# Patient Record
Sex: FEMALE | Race: WHITE | NOT HISPANIC OR LATINO | Employment: FULL TIME | URBAN - METROPOLITAN AREA
[De-identification: names, ages, dates, MRNs, and addresses within clinical notes are randomized per-mention and may not be internally consistent; named-entity substitution may affect disease eponyms.]

---

## 2019-04-11 ENCOUNTER — TELEPHONE (OUTPATIENT)
Dept: ENDOCRINOLOGY | Facility: CLINIC | Age: 34
End: 2019-04-11

## 2019-05-13 LAB
CREAT ?TM UR-SCNC: 55.7 UMOL/L
EXT PROTEIN URINE: 24.7
PROT/CREAT UR: 443 MG/G{CREAT}

## 2019-06-06 ENCOUNTER — CONSULT (OUTPATIENT)
Dept: ENDOCRINOLOGY | Facility: HOSPITAL | Age: 34
End: 2019-06-06
Payer: COMMERCIAL

## 2019-06-06 VITALS
HEIGHT: 63 IN | HEART RATE: 90 BPM | WEIGHT: 140 LBS | SYSTOLIC BLOOD PRESSURE: 120 MMHG | BODY MASS INDEX: 24.8 KG/M2 | DIASTOLIC BLOOD PRESSURE: 70 MMHG

## 2019-06-06 DIAGNOSIS — E05.90 HYPERTHYROIDISM: Primary | ICD-10-CM

## 2019-06-06 PROCEDURE — 99244 OFF/OP CNSLTJ NEW/EST MOD 40: CPT | Performed by: INTERNAL MEDICINE

## 2019-06-06 RX ORDER — PROPRANOLOL HYDROCHLORIDE 10 MG/1
10 TABLET ORAL DAILY
Refills: 6 | COMMUNITY
Start: 2019-06-03

## 2019-06-07 ENCOUNTER — TELEPHONE (OUTPATIENT)
Dept: ENDOCRINOLOGY | Facility: HOSPITAL | Age: 34
End: 2019-06-07

## 2019-06-07 DIAGNOSIS — E05.90 HYPERTHYROIDISM: Primary | ICD-10-CM

## 2019-06-07 RX ORDER — METHIMAZOLE 10 MG/1
30 TABLET ORAL 3 TIMES DAILY
Qty: 270 TABLET | Refills: 2 | Status: SHIPPED | OUTPATIENT
Start: 2019-06-07 | End: 2019-08-06 | Stop reason: SDUPTHER

## 2019-06-13 ENCOUNTER — TELEPHONE (OUTPATIENT)
Dept: ENDOCRINOLOGY | Facility: HOSPITAL | Age: 34
End: 2019-06-13

## 2019-06-28 ENCOUNTER — TELEPHONE (OUTPATIENT)
Dept: ENDOCRINOLOGY | Facility: HOSPITAL | Age: 34
End: 2019-06-28

## 2019-07-01 ENCOUNTER — OFFICE VISIT (OUTPATIENT)
Dept: ENDOCRINOLOGY | Facility: HOSPITAL | Age: 34
End: 2019-07-01
Payer: COMMERCIAL

## 2019-07-01 VITALS
HEIGHT: 63 IN | HEART RATE: 90 BPM | WEIGHT: 136.4 LBS | BODY MASS INDEX: 24.17 KG/M2 | DIASTOLIC BLOOD PRESSURE: 70 MMHG | SYSTOLIC BLOOD PRESSURE: 120 MMHG

## 2019-07-01 DIAGNOSIS — E05.90 HYPERTHYROIDISM: Primary | ICD-10-CM

## 2019-07-01 PROCEDURE — 99214 OFFICE O/P EST MOD 30 MIN: CPT | Performed by: INTERNAL MEDICINE

## 2019-07-01 NOTE — PROGRESS NOTES
7/1/2019    Assessment/Plan      Diagnoses and all orders for this visit:    Hyperthyroidism  -     TSH, 3rd generation Lab Collect  -     T4, free Lab Collect  -     T3, free        Assessment/Plan:  Hyperthyroidism likely on the basis of grave's disease  She appears to be having a reaction to the methimazole  I have asked her to discontinue the methimazole  She is to call in several days to see if the symptoms of the reaction start to resolve  We will then have to determine the next treatment since she seems to have had a reaction to the methimazole  It is unfortunate since some of her hyperthyroid symptoms are improving  She will be getting blood work done tomorrow and I have asked her to check a TSH, free T, and Free T3       CC: hyperthyroid follow up    History of Present Illness     HPI: Beatriz Turner is a 35y o  year old female with history of Hyperthyroidism likely due to Graves disease  She was diagnosed with hyperthyroidism in the spring 2019 and started on methimazole 10 mg 3 tablets daily 06/07/2019  She developed itchiness and scratching, but no rash last week, 1 week ago  She saw PCP and treated as scabies with an ointment  She was having problems swallowing and throat irritated and feel s lump in throat over the last several days  She has been taking benadryl and a cream    She still has heat intolerance, tremors though improved, irritability, and emotional lability  She has lost another 4 lbs in 1 month  She has less frequent bowel movements  She still has insomnia  She denies diarrhea, constipation, palpitations  She has no diplopia  She has been out of work since 6/3/19 per her PCP  She has been exhausted and up all night , very emotional and couldn't work  She has been very overwhelmed at work and had to leave work  Review of Systems   Constitutional: Positive for fatigue and unexpected weight change  4 lbs less than last visit  HENT: Negative for trouble swallowing  Felt throat more scratchy and tight and a lump in throat  Eyes: Negative for visual disturbance  No diplopia  Respiratory: Negative for chest tightness and shortness of breath  Cardiovascular: Negative for chest pain, palpitations and leg swelling  Gastrointestinal: Negative for abdominal pain, constipation, diarrhea and nausea  Bowel movements less frequent  Endocrine: Positive for heat intolerance  Negative for cold intolerance  Genitourinary:        Menses did occur since treatment started but light and only 3 days long as were very heavy in the past    Musculoskeletal: Positive for arthralgias  Having arthralgias  Migratory symptoms  Skin: Negative for rash  Has itchiness and worse with the heat  Neurological: Positive for tremors  Negative for dizziness and light-headedness  Tremors improved  Psychiatric/Behavioral: Positive for sleep disturbance  Negative for dysphoric mood  The patient is nervous/anxious  Anxiety improved  Still insomnia  Still very irritable and crying a lot  Historical Information   No past medical history on file    Past Surgical History:   Procedure Laterality Date    APPENDECTOMY       SECTION      X 2     Social History   Social History     Substance and Sexual Activity   Alcohol Use Never    Frequency: Never     Social History     Substance and Sexual Activity   Drug Use Not Currently    Types: Heroin    Comment: off herion for 3 years     Social History     Tobacco Use   Smoking Status Current Every Day Smoker    Types: Cigarettes   Smokeless Tobacco Never Used     Family History:   Family History   Problem Relation Age of Onset    Obesity Mother     Diabetes type II Mother     Psoriasis Mother         psoriatic arthritis    Hyperthyroidism Father     Diabetes type I Father         bilateral amputations    Obesity Father     Hyperlipidemia Father     Hyperthyroidism Paternal Aunt     Thyroid disease unspecified Paternal Aunt         3 aunts with thyroid disease    Thyroid cancer Paternal Aunt     Cancer Paternal Grandmother     Cancer Paternal Grandfather     No Known Problems Sister     No Known Problems Brother     No Known Problems Son     No Known Problems Son     Hyperthyroidism Maternal Aunt     Thyroid disease unspecified Maternal Aunt        Meds/Allergies   Current Outpatient Medications   Medication Sig Dispense Refill    methadone (DOLOPHINE) 10 MG/5ML solution Take 65 mg by mouth daily      methimazole (TAPAZOLE) 10 mg tablet Take 3 tablets (30 mg total) by mouth 3 (three) times a day 270 tablet 2    propranolol (INDERAL) 10 mg tablet Take 10 mg by mouth 2 (two) times a day Has not started this yet  6     No current facility-administered medications for this visit  Allergies   Allergen Reactions    Lasix [Furosemide] Hives       Objective   Vitals: Blood pressure 120/70, pulse 90, height 5' 3" (1 6 m), weight 61 9 kg (136 lb 6 4 oz)  Invasive Devices     None                 Physical Exam   Constitutional: She is oriented to person, place, and time  She appears well-developed and well-nourished  HENT:   Head: Normocephalic and atraumatic  Eyes: Pupils are equal, round, and reactive to light  Conjunctivae and EOM are normal    No lid lag, stare, proptosis, or periorbital edema  Neck: Normal range of motion  Neck supple  No thyromegaly present  Thyroid enlarged with a 6 cm right lobe and a 5 cm left lobe  Bruit noted over the thyroid gland  No palpable thyroid nodules  Cardiovascular: Normal rate, regular rhythm and normal heart sounds  No murmur heard  Pulmonary/Chest: Effort normal and breath sounds normal  She has no wheezes  Musculoskeletal: Normal range of motion  She exhibits no edema or deformity  Slight tremor of the outstretched hands  Lymphadenopathy:     She has no cervical adenopathy     Neurological: She is alert and oriented to person, place, and time  She has normal reflexes  Deep tendon reflexes normal without briskness  Skin: Skin is warm and dry  No rash noted  Vitals reviewed  The history was obtained from the review of the chart and from the patient  Lab Results:   I have no recent blood work      Future Appointments   Date Time Provider [place]   8/22/2019  8:20 AM Juancho Nguyen MD [facility]

## 2019-07-01 NOTE — PATIENT INSTRUCTIONS
Let's stop the methimazole for now  Continue the same propranolol  You can use benadryl for now   let's repeat the thyroid levels  Call with itchiness status by Friday    Follow up to be determined

## 2019-07-03 LAB
T3FREE SERPL-MCNC: 8.9 PG/ML (ref 2–4.4)
T4 FREE SERPL-MCNC: 2.53 NG/DL (ref 0.82–1.77)
TSH SERPL DL<=0.005 MIU/L-ACNC: <0.005 UIU/ML (ref 0.45–4.5)

## 2019-07-08 ENCOUNTER — TELEPHONE (OUTPATIENT)
Dept: ENDOCRINOLOGY | Facility: CLINIC | Age: 34
End: 2019-07-08

## 2019-07-08 NOTE — TELEPHONE ENCOUNTER
Pt called today stating she stopped taking the thyroid med on 7-1-19  Since then she still had a rash and was itchy, so, she doesn't think she was allergic to the meds  If you have any questions, please call the pt  Thanx

## 2019-07-08 NOTE — TELEPHONE ENCOUNTER
She can have joint pain with hyperthyroidism  The lump is not from the thyroid, that should be evaluated by the PCP

## 2019-07-08 NOTE — TELEPHONE ENCOUNTER
Patient aware, she notes that since last week she developed a lump on left side of neck  She would also like to know if it is normal for her to have joint pain in different areas every day  She states that her joints feel stiff

## 2019-07-08 NOTE — TELEPHONE ENCOUNTER
I filled out her disability paperwork  This information will be faxed for her  Since her rash and itching is not improved, I would have her retry her methimazole 10 mg 2 tablets daily only  If she starts to have worsening of her rash in itching or any breathing problems, she must stop it immediately

## 2019-07-30 ENCOUNTER — TELEPHONE (OUTPATIENT)
Dept: ENDOCRINOLOGY | Facility: HOSPITAL | Age: 34
End: 2019-07-30

## 2019-08-06 DIAGNOSIS — E05.90 HYPERTHYROIDISM: ICD-10-CM

## 2019-08-06 RX ORDER — METHIMAZOLE 10 MG/1
30 TABLET ORAL 3 TIMES DAILY
Qty: 810 TABLET | Refills: 2 | Status: SHIPPED | OUTPATIENT
Start: 2019-08-06

## 2019-08-20 ENCOUNTER — TELEPHONE (OUTPATIENT)
Dept: ENDOCRINOLOGY | Facility: HOSPITAL | Age: 34
End: 2019-08-20

## 2019-08-20 NOTE — TELEPHONE ENCOUNTER
Received call from patient  She states she had 2 teeth that broke over the weekend  She is being evaluated by her dentist today, she believes she will have to have teeth removed this week  She would like to know if there are medications she needs to avoid because of her hyperthyroidism  She states she is taking the methimazole  Please advise

## 2019-08-20 NOTE — TELEPHONE ENCOUNTER
They need to avoid epinephrine injections which they sometimes add to numbing injections to treat teeth issues  This is not because of the methimazole, it is because to the hyperthyroidism and will make that worse

## 2019-08-22 ENCOUNTER — OFFICE VISIT (OUTPATIENT)
Dept: ENDOCRINOLOGY | Facility: HOSPITAL | Age: 34
End: 2019-08-22
Payer: COMMERCIAL

## 2019-08-22 VITALS
WEIGHT: 133.4 LBS | SYSTOLIC BLOOD PRESSURE: 120 MMHG | BODY MASS INDEX: 23.64 KG/M2 | HEIGHT: 63 IN | DIASTOLIC BLOOD PRESSURE: 70 MMHG | HEART RATE: 82 BPM

## 2019-08-22 DIAGNOSIS — E05.00 GRAVES DISEASE: ICD-10-CM

## 2019-08-22 DIAGNOSIS — E05.90 HYPERTHYROIDISM: Primary | ICD-10-CM

## 2019-08-22 PROCEDURE — 99214 OFFICE O/P EST MOD 30 MIN: CPT | Performed by: INTERNAL MEDICINE

## 2019-08-22 NOTE — PATIENT INSTRUCTIONS
The thyrodi blood work was slightly improved in early July  Let's get blood work done now  Continue the same methimazole 10 mg 2 tablets twice a day  Follow up in 6-8 weeks with blood work

## 2019-08-22 NOTE — PROGRESS NOTES
8/23/2019    Assessment/Plan      Diagnoses and all orders for this visit:    Hyperthyroidism  -     TSH, 3rd generation Lab Collect  -     T4, free Lab Collect  -     T3, free  -     Comprehensive metabolic panel Lab Collect  -     TSH, 3rd generation Lab Collect; Future  -     T4, free Lab Collect; Future  -     T3, free; Future    Graves disease  -     TSH, 3rd generation Lab Collect  -     T4, free Lab Collect  -     T3, free  -     Comprehensive metabolic panel Lab Collect  -     TSH, 3rd generation Lab Collect; Future  -     T4, free Lab Collect; Future  -     T3, free; Future        Assessment/Plan:   hyperthyroidism due to Graves disease  Her last blood work in early July had started to improve on methimazole treatment  I have no more recent blood work  She will get a TSH with free T4 and free T3 and CMP done now  For now, she will continue the same methimazole 20 mg twice a day  She is still having a lot of hyperthyroid symptoms and is unable to work  I have also asked her to follow up in 6-8 weeks with preceding TSH, free T4, and free T3     CC:   Hyperthyroid follow-up    History of Present Illness     HPI: Ace Flowers is a 35y o  year old female with history of  Hyperthyroidism due to Graves disease  She was diagnosed with hyperthyroidism likely due to Graves disease in early June 2019  She was placed on methimazole 30 mg daily but did develop some rash temporarily  The rash did not resolve off methimazole so the methimazole was restarted in mid July at methimazole 10 mg 2 tablets daily  She will have days when feels good and days when significant symptoms again  She remains hot and sweaty  She still gets daily palpitations  She is still tremulous  She is still anxious  She has fatigue  Weight is 3 lb less than last visit  She will have episodes with heart racing, anxiety, and shakiness  She still has hair loss  Her arthralgias though are improving    Menstrual cycles are occurring Over 30 days and are usually late  She denies diarrhea or constipation, cold intolerance, or depression  She denies dry skin or brittle nails  She has no diplopia  She has been feeling more pressure with swallowing over the last 2-3 weeks  She thinks thyroid gland feels  larger, now on the right side  Now has a tooth issue and needs dental procedure  Review of Systems   Constitutional: Positive for fatigue  Negative for unexpected weight change  3 lbs less than 2019  HENT: Negative for trouble swallowing  Feels more pressure with swallowing for the last 2-3 weeks  Eyes: Negative for visual disturbance  No diplopia  Respiratory: Negative for chest tightness and shortness of breath  Cardiovascular: Positive for palpitations  Negative for chest pain  Still gets palpitations daily  Gastrointestinal: Negative for abdominal pain, constipation, diarrhea and nausea  Endocrine: Positive for heat intolerance  Negative for cold intolerance  Genitourinary:        Menses occurring over 30 days and late but occurring  Musculoskeletal: Positive for arthralgias  Arthralgias improved  Skin: Negative for rash  No dry skin  No brittle nails  Still hair loss  Neurological: Positive for tremors  Negative for dizziness, light-headedness and headaches  Psychiatric/Behavioral: Positive for sleep disturbance  Negative for dysphoric mood  The patient is nervous/anxious  Will have episodes of heart racing , anxiety and shakiness, not constant  Historical Information   No past medical history on file    Past Surgical History:   Procedure Laterality Date    APPENDECTOMY       SECTION      X 2     Social History   Social History     Substance and Sexual Activity   Alcohol Use Never    Frequency: Never     Social History     Substance and Sexual Activity   Drug Use Not Currently    Types: Heroin    Comment: off herion for 3 years     Social History     Tobacco Use   Smoking Status Current Every Day Smoker    Types: Cigarettes   Smokeless Tobacco Never Used     Family History:   Family History   Problem Relation Age of Onset    Obesity Mother     Diabetes type II Mother     Psoriasis Mother         psoriatic arthritis    Hyperthyroidism Father     Diabetes type I Father         bilateral amputations    Obesity Father     Hyperlipidemia Father     Hyperthyroidism Paternal Aunt     Thyroid disease unspecified Paternal Aunt         3 aunts with thyroid disease    Thyroid cancer Paternal Aunt     Cancer Paternal Grandmother     Cancer Paternal Grandfather     No Known Problems Sister     No Known Problems Brother     No Known Problems Son     No Known Problems Son     Hyperthyroidism Maternal Aunt     Thyroid disease unspecified Maternal Aunt        Meds/Allergies   Current Outpatient Medications   Medication Sig Dispense Refill    methadone (DOLOPHINE) 10 MG/5ML solution Take 65 mg by mouth daily      methimazole (TAPAZOLE) 10 mg tablet Take 3 tablets (30 mg total) by mouth 3 (three) times a day (Patient taking differently: Take 20 mg by mouth 2 (two) times a day ) 810 tablet 2    propranolol (INDERAL) 10 mg tablet Take 10 mg by mouth daily   6     No current facility-administered medications for this visit  Allergies   Allergen Reactions    Lasix [Furosemide] Hives       Objective   Vitals: Blood pressure 120/70, pulse 82, height 5' 3" (1 6 m), weight 60 5 kg (133 lb 6 4 oz)  Invasive Devices     None                 Physical Exam   Constitutional: She is oriented to person, place, and time  She appears well-developed and well-nourished  HENT:   Head: Normocephalic and atraumatic  Eyes: Pupils are equal, round, and reactive to light  Conjunctivae and EOM are normal      No lid lag, stare, proptosis, or periorbital edema  Neck: Normal range of motion  Neck supple  Thyromegaly present     7 cm right and 6 cm left thyroid gland  Bilateral bruits of the thyroid  Cardiovascular: Normal rate, regular rhythm and normal heart sounds  No murmur heard  Pulmonary/Chest: Effort normal and breath sounds normal  She has no wheezes  Musculoskeletal: Normal range of motion  She exhibits no edema or deformity  Slight fine tremor of the outstretched hands  Lymphadenopathy:     She has no cervical adenopathy  Neurological: She is alert and oriented to person, place, and time  She has normal reflexes  Deep tendon reflexes a bit brisk  Skin: Skin is warm and dry  No rash noted  Vitals reviewed  The history was obtained from the review of the chart and from the patient  Lab Results:        Blood work done on 07/02/2019 demonstrates the following results:  Lab Results   Component Value Date    TSH <0 005 (L) 07/02/2019    FREET4 2 53 (H) 07/02/2019     Free T3 was 8 9        Future Appointments   Date Time Provider [place]   10/18/2019  9:00 AM Latia Cisneros MD [facility]

## 2019-08-27 ENCOUNTER — TELEPHONE (OUTPATIENT)
Dept: ENDOCRINOLOGY | Facility: HOSPITAL | Age: 34
End: 2019-08-27

## 2019-08-27 NOTE — TELEPHONE ENCOUNTER
Patient also stopped in and dropped off additional disability forms for [employer]  Are in your in-bin  Call patient when ready to  ([phone])

## 2019-08-27 NOTE — TELEPHONE ENCOUNTER
Received request from [facility] from [facility] for last office note and form for short term disability to be filled out  In your in-bin   Release of information scanned in

## 2019-08-28 LAB
ALBUMIN SERPL-MCNC: 4.4 G/DL (ref 3.5–5.5)
ALBUMIN/GLOB SERPL: 1.8 {RATIO} (ref 1.2–2.2)
ALP SERPL-CCNC: 185 IU/L (ref 39–117)
ALT SERPL-CCNC: 31 IU/L (ref 0–32)
AST SERPL-CCNC: 29 IU/L (ref 0–40)
BILIRUB SERPL-MCNC: 0.3 MG/DL (ref 0–1.2)
BUN SERPL-MCNC: 8 MG/DL (ref 6–20)
BUN/CREAT SERPL: 16 (ref 9–23)
CALCIUM SERPL-MCNC: 9.7 MG/DL (ref 8.7–10.2)
CHLORIDE SERPL-SCNC: 103 MMOL/L (ref 96–106)
CO2 SERPL-SCNC: 22 MMOL/L (ref 20–29)
CREAT SERPL-MCNC: 0.49 MG/DL (ref 0.57–1)
GLOBULIN SER-MCNC: 2.4 G/DL (ref 1.5–4.5)
GLUCOSE SERPL-MCNC: 113 MG/DL (ref 65–99)
POTASSIUM SERPL-SCNC: 4.3 MMOL/L (ref 3.5–5.2)
PROT SERPL-MCNC: 6.8 G/DL (ref 6–8.5)
SL AMB EGFR AFRICAN AMERICAN: 148 ML/MIN/1.73
SL AMB EGFR NON AFRICAN AMERICAN: 128 ML/MIN/1.73
SODIUM SERPL-SCNC: 139 MMOL/L (ref 134–144)
T3FREE SERPL-MCNC: 15.2 PG/ML (ref 2–4.4)
T4 FREE SERPL-MCNC: 4.24 NG/DL (ref 0.82–1.77)
TSH SERPL DL<=0.005 MIU/L-ACNC: <0.005 UIU/ML (ref 0.45–4.5)

## 2019-08-28 NOTE — TELEPHONE ENCOUNTER
Faxed Disability paperwork to [employer] (fax [phone])  Called patient and l/m that [employer] Dis[employer] form is ready for her to

## 2019-10-18 ENCOUNTER — OFFICE VISIT (OUTPATIENT)
Dept: ENDOCRINOLOGY | Facility: HOSPITAL | Age: 34
End: 2019-10-18
Payer: COMMERCIAL

## 2019-10-18 VITALS
HEIGHT: 63 IN | WEIGHT: 146 LBS | BODY MASS INDEX: 25.87 KG/M2 | SYSTOLIC BLOOD PRESSURE: 124 MMHG | DIASTOLIC BLOOD PRESSURE: 70 MMHG | HEART RATE: 72 BPM

## 2019-10-18 DIAGNOSIS — E05.00 GRAVES DISEASE: ICD-10-CM

## 2019-10-18 DIAGNOSIS — E05.90 HYPERTHYROIDISM: Primary | ICD-10-CM

## 2019-10-18 LAB
T3FREE SERPL-MCNC: 5.2 PG/ML (ref 2–4.4)
T4 FREE SERPL-MCNC: 2.26 NG/DL (ref 0.82–1.77)
TSH SERPL DL<=0.005 MIU/L-ACNC: <0.005 UIU/ML (ref 0.45–4.5)

## 2019-10-18 PROCEDURE — 99214 OFFICE O/P EST MOD 30 MIN: CPT | Performed by: INTERNAL MEDICINE

## 2019-10-18 NOTE — PATIENT INSTRUCTIONS
The thyrodi blood work is still overactive but much better  Continue the same methimazole 10 mg 2 tablets twice a day and we'll give it more time  Continue the propranolol  You can go back to work  You are cleared to go back on 10/21/19  We'll get blood work in 2 months  Follow up in 4 months with blood work

## 2019-10-18 NOTE — PROGRESS NOTES
10/20/2019    Assessment/Plan      Diagnoses and all orders for this visit:    Hyperthyroidism  -     TSH, 3rd generation Lab Collect; Future  -     T4, free Lab Collect; Future  -     T3, free; Future  -     T4, free Lab Collect; Future  -     TSH, 3rd generation Lab Collect; Future  -     T3, free; Future    Graves disease  -     TSH, 3rd generation Lab Collect; Future  -     T4, free Lab Collect; Future  -     T3, free; Future  -     T4, free Lab Collect; Future  -     TSH, 3rd generation Lab Collect; Future  -     T3, free; Future        Assessment/Plan:  1  Hyperthyroidism  Her hyperthyroidism is most likely on the basis of Graves disease based on history and physical exam   At this point, blood work does continue to show some hyperthyroidism but did levels of her blood work are significantly improved from September of 2019 she has significant improvement of symptomatology  At this point, I will continue the same methimazole 20 mg twice a day and propranolol 10 mg daily as needed  I would like to give a little bit more time on this dosage to see if we can get her blood work down even further  Hopefully I will not have to resort to I 131 treatment or surgery for her thyroid disease which was a possibility if her levels did not improve  At this point, she is cleared to go back to work  I did fill out this paperwork while she was in the office today  2  Graves disease  She has no evidence of Graves ophthalmopathy but I have asked her to make sure that she sees an eye doctor on a regular basis  I have asked her to repeat blood work in 2 months consisting of a TSH, free T4, and free T3 and methimazole can be adjusted as needed  I will have her follow up in 4 months with preceding TSH, free T4, and free T3       CC:   Hyperthyroid follow-up    History of Present Illness     HPI: Nara Blunt is a 35y o  year old female with history of Hyperthyroidism due to Graves disease    She was diagnosed in the spring 2019  She had difficulty when she was taking much higher dosages of methimazole with a mild itching and rash which has resolved and she has been able to tolerate methimazole at this point  She is on methimazole 10 mg 2 tablets twice a day  She is also taking propranolol 10 mg daily as needed  Weight is 13 lb more than last visit  She still has some fatigue but that is improved and she wants to get back to work as she feels she can function  She denies tremors, heat intolerance or cold intolerance, diarrhea or constipation, anxiety or depression, insomnia, palpitation, dry skin, or brittle nails  She still has some hair loss  She has no compressive thyroid symptoms or difficulties with swallowing  She has no diplopia  Menstrual cycles have been occurring on a regular monthly basis and lasting 3 days  Review of Systems   Constitutional: Positive for fatigue and unexpected weight change  Weight 13 lb more than last visit  Still some fatigue, much better, able to function  Not yet back to work  HENT: Negative for trouble swallowing  Eyes: Negative for visual disturbance  No diplopia  Respiratory: Negative for chest tightness and shortness of breath  Cardiovascular: Negative for chest pain and palpitations  Gastrointestinal: Negative for abdominal pain, constipation, diarrhea and nausea  Endocrine: Negative for cold intolerance and heat intolerance  Genitourinary:        Menses regular on a monthly basis and last 3 days  Skin: Negative for rash  No dry skin  No brittle nails  Less hair loss  Neurological: Negative for dizziness, tremors, weakness, light-headedness and headaches  Psychiatric/Behavioral: Negative for dysphoric mood and sleep disturbance  The patient is not nervous/anxious  Historical Information   No past medical history on file    Past Surgical History:   Procedure Laterality Date    APPENDECTOMY       SECTION      X 2 Social History   Social History     Substance and Sexual Activity   Alcohol Use Never    Frequency: Never     Social History     Substance and Sexual Activity   Drug Use Not Currently    Types: Heroin    Comment: off herion for 3 years     Social History     Tobacco Use   Smoking Status Current Every Day Smoker    Types: Cigarettes   Smokeless Tobacco Never Used     Family History:   Family History   Problem Relation Age of Onset    Obesity Mother     Diabetes type II Mother     Psoriasis Mother         psoriatic arthritis    Hyperthyroidism Father     Diabetes type I Father         bilateral amputations    Obesity Father     Hyperlipidemia Father     Hyperthyroidism Paternal Aunt     Thyroid disease unspecified Paternal Aunt         3 aunts with thyroid disease    Thyroid cancer Paternal Aunt     Cancer Paternal Grandmother     Cancer Paternal Grandfather     No Known Problems Sister     No Known Problems Brother     No Known Problems Son     No Known Problems Son     Hyperthyroidism Maternal Aunt     Thyroid disease unspecified Maternal Aunt        Meds/Allergies   Current Outpatient Medications   Medication Sig Dispense Refill    methadone (DOLOPHINE) 10 MG/5ML solution Take 65 mg by mouth daily      methimazole (TAPAZOLE) 10 mg tablet Take 3 tablets (30 mg total) by mouth 3 (three) times a day (Patient taking differently: Take 20 mg by mouth 2 (two) times a day ) 810 tablet 2    propranolol (INDERAL) 10 mg tablet Take 10 mg by mouth daily   6     No current facility-administered medications for this visit  Allergies   Allergen Reactions    Lasix [Furosemide] Hives       Objective   Vitals: Blood pressure 124/70, pulse 72, height 5' 3" (1 6 m), weight 66 2 kg (146 lb)  Invasive Devices     None                 Physical Exam   Constitutional: She is oriented to person, place, and time  She appears well-developed and well-nourished  HENT:   Head: Normocephalic and atraumatic  Eyes: Pupils are equal, round, and reactive to light  Conjunctivae and EOM are normal    No lid lag, stare, proptosis, or periorbital edema  Neck: Normal range of motion  Neck supple  No thyromegaly present  Thyroid remains enlarged with lobes measuring 6 cm on the right and 5 cm on the left in height  No bruits over the thyroid gland or carotids  Cardiovascular: Normal rate, regular rhythm and normal heart sounds  No murmur heard  Pulmonary/Chest: Effort normal and breath sounds normal  She has no wheezes  Musculoskeletal: Normal range of motion  She exhibits no edema or deformity  Slight fine tremor of the outstretched hands  Lymphadenopathy:     She has no cervical adenopathy  Neurological: She is alert and oriented to person, place, and time  She has normal reflexes  Deep tendon reflexes a bit brisk  Skin: Skin is warm and dry  No rash noted  Vitals reviewed  The history was obtained from the review of the chart and from the patient  Lab Results:      Blood work done at [facility] on 10/17/2019 showed a TSH of less than 0 005 with a free T4 at 2 26 and free T3 of 5 2  Previous blood work done back on 08/27/2019 showed a TSH of less than 0 005 with a free T4 of 4 24 and a free T3 of 15 2        Future Appointments   Date Time Provider [place]   2/21/2020  9:45 AM KEVAN Gaitan [facility]

## 2020-01-05 ENCOUNTER — HOSPITAL ENCOUNTER (EMERGENCY)
Facility: HOSPITAL | Age: 35
Discharge: HOME | End: 2020-01-05
Attending: EMERGENCY MEDICINE | Admitting: EMERGENCY MEDICINE
Payer: COMMERCIAL

## 2020-01-05 ENCOUNTER — APPOINTMENT (EMERGENCY)
Dept: ULTRASOUND IMAGING | Facility: HOSPITAL | Age: 35
End: 2020-01-05
Payer: COMMERCIAL

## 2020-01-05 VITALS
OXYGEN SATURATION: 100 % | RESPIRATION RATE: 18 BRPM | HEART RATE: 69 BPM | BODY MASS INDEX: 25.85 KG/M2 | TEMPERATURE: 97.3 F | WEIGHT: 145.94 LBS | SYSTOLIC BLOOD PRESSURE: 132 MMHG | DIASTOLIC BLOOD PRESSURE: 63 MMHG

## 2020-01-05 DIAGNOSIS — B34.9 VIRAL SYNDROME: ICD-10-CM

## 2020-01-05 DIAGNOSIS — O20.0 THREATENED ABORTION IN EARLY PREGNANCY: Primary | ICD-10-CM

## 2020-01-05 DIAGNOSIS — O23.40 UTI (URINARY TRACT INFECTION) DURING PREGNANCY: ICD-10-CM

## 2020-01-05 LAB
ABO GROUP BLD: NORMAL
ANION GAP SERPL CALCULATED.3IONS-SCNC: 5 MMOL/L (ref 4–13)
B-HCG SERPL-ACNC: ABNORMAL MIU/ML
BASOPHILS # BLD AUTO: 0.03 THOUSANDS/ΜL (ref 0–0.1)
BASOPHILS NFR BLD AUTO: 0 % (ref 0–1)
BLD GP AB SCN SERPL QL: POSITIVE
BUN SERPL-MCNC: 9 MG/DL (ref 5–25)
CALCIUM SERPL-MCNC: 9.5 MG/DL (ref 8.3–10.1)
CHLORIDE SERPL-SCNC: 101 MMOL/L (ref 100–108)
CLARITY, POC: CLEAR
CO2 SERPL-SCNC: 28 MMOL/L (ref 21–32)
COLOR, POC: NORMAL
CREAT SERPL-MCNC: 0.45 MG/DL (ref 0.6–1.3)
EOSINOPHIL # BLD AUTO: 0.07 THOUSAND/ΜL (ref 0–0.61)
EOSINOPHIL NFR BLD AUTO: 1 % (ref 0–6)
ERYTHROCYTE [DISTWIDTH] IN BLOOD BY AUTOMATED COUNT: 11.9 % (ref 11.6–15.1)
EXT BILIRUBIN, UA: NEGATIVE
EXT BLOOD URINE: NORMAL
EXT GLUCOSE, UA: NEGATIVE
EXT KETONES: NEGATIVE
EXT NITRITE, UA: POSITIVE
EXT PH, UA: 7
EXT PREG TEST URINE: POSITIVE
EXT PROTEIN, UA: NEGATIVE
EXT SPECIFIC GRAVITY, UA: 1.01
EXT UROBILINOGEN: 0.2
EXT. CONTROL ED NAV: NORMAL
GFR SERPL CREATININE-BSD FRML MDRD: 131 ML/MIN/1.73SQ M
GLUCOSE SERPL-MCNC: 66 MG/DL (ref 65–140)
GLUCOSE SERPL-MCNC: 85 MG/DL (ref 65–140)
GLUCOSE SERPL-MCNC: 97 MG/DL (ref 65–140)
HCT VFR BLD AUTO: 38.7 % (ref 34.8–46.1)
HGB BLD-MCNC: 13 G/DL (ref 11.5–15.4)
IMM GRANULOCYTES # BLD AUTO: 0.01 THOUSAND/UL (ref 0–0.2)
IMM GRANULOCYTES NFR BLD AUTO: 0 % (ref 0–2)
LYMPHOCYTES # BLD AUTO: 1.72 THOUSANDS/ΜL (ref 0.6–4.47)
LYMPHOCYTES NFR BLD AUTO: 25 % (ref 14–44)
MCH RBC QN AUTO: 27.8 PG (ref 26.8–34.3)
MCHC RBC AUTO-ENTMCNC: 33.6 G/DL (ref 31.4–37.4)
MCV RBC AUTO: 83 FL (ref 82–98)
MONOCYTES # BLD AUTO: 0.5 THOUSAND/ΜL (ref 0.17–1.22)
MONOCYTES NFR BLD AUTO: 7 % (ref 4–12)
NEUTROPHILS # BLD AUTO: 4.68 THOUSANDS/ΜL (ref 1.85–7.62)
NEUTS SEG NFR BLD AUTO: 67 % (ref 43–75)
NRBC BLD AUTO-RTO: 0 /100 WBCS
PLATELET # BLD AUTO: 209 THOUSANDS/UL (ref 149–390)
PMV BLD AUTO: 11.3 FL (ref 8.9–12.7)
POTASSIUM SERPL-SCNC: 3.9 MMOL/L (ref 3.5–5.3)
RBC # BLD AUTO: 4.68 MILLION/UL (ref 3.81–5.12)
RH BLD: NEGATIVE
SODIUM SERPL-SCNC: 134 MMOL/L (ref 136–145)
SPECIMEN EXPIRATION DATE: NORMAL
WBC # BLD AUTO: 7.01 THOUSAND/UL (ref 4.31–10.16)
WBC # BLD EST: NEGATIVE 10*3/UL

## 2020-01-05 PROCEDURE — 86901 BLOOD TYPING SEROLOGIC RH(D): CPT | Performed by: EMERGENCY MEDICINE

## 2020-01-05 PROCEDURE — 76801 OB US < 14 WKS SINGLE FETUS: CPT

## 2020-01-05 PROCEDURE — 86850 RBC ANTIBODY SCREEN: CPT | Performed by: EMERGENCY MEDICINE

## 2020-01-05 PROCEDURE — 80048 BASIC METABOLIC PNL TOTAL CA: CPT | Performed by: EMERGENCY MEDICINE

## 2020-01-05 PROCEDURE — 96375 TX/PRO/DX INJ NEW DRUG ADDON: CPT

## 2020-01-05 PROCEDURE — 96365 THER/PROPH/DIAG IV INF INIT: CPT

## 2020-01-05 PROCEDURE — 84702 CHORIONIC GONADOTROPIN TEST: CPT | Performed by: EMERGENCY MEDICINE

## 2020-01-05 PROCEDURE — 86900 BLOOD TYPING SEROLOGIC ABO: CPT | Performed by: EMERGENCY MEDICINE

## 2020-01-05 PROCEDURE — 36415 COLL VENOUS BLD VENIPUNCTURE: CPT | Performed by: EMERGENCY MEDICINE

## 2020-01-05 PROCEDURE — 82948 REAGENT STRIP/BLOOD GLUCOSE: CPT

## 2020-01-05 PROCEDURE — 81002 URINALYSIS NONAUTO W/O SCOPE: CPT | Performed by: EMERGENCY MEDICINE

## 2020-01-05 PROCEDURE — 96372 THER/PROPH/DIAG INJ SC/IM: CPT

## 2020-01-05 PROCEDURE — 96366 THER/PROPH/DIAG IV INF ADDON: CPT

## 2020-01-05 PROCEDURE — 99284 EMERGENCY DEPT VISIT MOD MDM: CPT | Performed by: EMERGENCY MEDICINE

## 2020-01-05 PROCEDURE — 86870 RBC ANTIBODY IDENTIFICATION: CPT | Performed by: EMERGENCY MEDICINE

## 2020-01-05 PROCEDURE — 81025 URINE PREGNANCY TEST: CPT | Performed by: EMERGENCY MEDICINE

## 2020-01-05 PROCEDURE — 99284 EMERGENCY DEPT VISIT MOD MDM: CPT

## 2020-01-05 PROCEDURE — 85025 COMPLETE CBC W/AUTO DIFF WBC: CPT | Performed by: EMERGENCY MEDICINE

## 2020-01-05 RX ORDER — DIPHENHYDRAMINE HYDROCHLORIDE 25 MG/1
25 CAPSULE ORAL 3 TIMES DAILY PRN
Qty: 30 TABLET | Refills: 0 | Status: SHIPPED | OUTPATIENT
Start: 2020-01-05 | End: 2020-02-28 | Stop reason: ALTCHOICE

## 2020-01-05 RX ORDER — CEPHALEXIN 250 MG/1
500 CAPSULE ORAL EVERY 12 HOURS SCHEDULED
Qty: 20 CAPSULE | Refills: 0 | Status: SHIPPED | OUTPATIENT
Start: 2020-01-05 | End: 2020-01-10

## 2020-01-05 RX ORDER — ONDANSETRON 2 MG/ML
4 INJECTION INTRAMUSCULAR; INTRAVENOUS ONCE
Status: COMPLETED | OUTPATIENT
Start: 2020-01-05 | End: 2020-01-05

## 2020-01-05 RX ORDER — DEXTROSE MONOHYDRATE 25 G/50ML
25 INJECTION, SOLUTION INTRAVENOUS ONCE
Status: DISCONTINUED | OUTPATIENT
Start: 2020-01-05 | End: 2020-01-05

## 2020-01-05 RX ORDER — CEPHALEXIN 250 MG/1
500 CAPSULE ORAL ONCE
Status: COMPLETED | OUTPATIENT
Start: 2020-01-05 | End: 2020-01-05

## 2020-01-05 RX ADMIN — ONDANSETRON 4 MG: 2 INJECTION INTRAMUSCULAR; INTRAVENOUS at 16:54

## 2020-01-05 RX ADMIN — DEXTROSE AND SODIUM CHLORIDE 1000 ML: 5; .9 INJECTION, SOLUTION INTRAVENOUS at 16:55

## 2020-01-05 RX ADMIN — HUMAN RHO(D) IMMUNE GLOBULIN 300 MCG: 300 INJECTION, SOLUTION INTRAMUSCULAR at 21:30

## 2020-01-05 RX ADMIN — CEPHALEXIN 500 MG: 250 CAPSULE ORAL at 19:17

## 2020-01-05 NOTE — ED PROVIDER NOTES
History  Chief Complaint   Patient presents with    Vaginal Bleeding     To ED with c/o nausea, cold sx, and vaginal bleeding off and on  for 3 weeks  Patient states that she took a pregnancy test 3 days ago which was positive   Nausea     29year-old  presents for evaluation of nausea, cold symptoms, and intermittent vaginal bleeding for the past couple of days  Patient states that she took a home pregnancy test 3 days ago although she has not been feeling well for 2 weeks  The patient states that she has mild intermittent spotting  Prior to Admission Medications   Prescriptions Last Dose Informant Patient Reported? Taking? methadone (DOLOPHINE) 10 MG/5ML solution  Self Yes No   Sig: Take 65 mg by mouth daily   methimazole (TAPAZOLE) 10 mg tablet  Self No No   Sig: Take 3 tablets (30 mg total) by mouth 3 (three) times a day   Patient taking differently: Take 20 mg by mouth 2 (two) times a day    propranolol (INDERAL) 10 mg tablet  Self Yes No   Sig: Take 10 mg by mouth daily       Facility-Administered Medications: None       No past medical history on file      Past Surgical History:   Procedure Laterality Date    APPENDECTOMY       SECTION      X 2       Family History   Problem Relation Age of Onset    Obesity Mother     Diabetes type II Mother     Psoriasis Mother         psoriatic arthritis    Hyperthyroidism Father     Diabetes type I Father         bilateral amputations    Obesity Father     Hyperlipidemia Father     Hyperthyroidism Paternal Aunt     Thyroid disease unspecified Paternal Aunt         3 aunts with thyroid disease    Thyroid cancer Paternal Aunt     Cancer Paternal Grandmother     Cancer Paternal Grandfather     No Known Problems Sister     No Known Problems Brother     No Known Problems Son     No Known Problems Son     Hyperthyroidism Maternal Aunt     Thyroid disease unspecified Maternal Aunt      I have reviewed and agree with the history as documented  Social History     Tobacco Use    Smoking status: Current Every Day Smoker     Types: Cigarettes    Smokeless tobacco: Never Used   Substance Use Topics    Alcohol use: Never     Frequency: Never    Drug use: Not Currently     Types: Heroin     Comment: off herion for 3 years        Review of Systems   Constitutional: Negative for chills and fever  Cardiovascular: Positive for chest pain  Gastrointestinal: Positive for nausea  Genitourinary: Positive for vaginal bleeding  Neurological: Negative for syncope and light-headedness  All other systems reviewed and are negative  Physical Exam  Physical Exam   Constitutional: She is oriented to person, place, and time  She appears well-developed and well-nourished  No distress  HENT:   Head: Normocephalic and atraumatic  Right Ear: External ear normal    Left Ear: External ear normal    Eyes: Pupils are equal, round, and reactive to light  Conjunctivae and EOM are normal  No scleral icterus  Neck: Normal range of motion  Cardiovascular: Normal rate, regular rhythm and normal heart sounds  Pulmonary/Chest: Effort normal and breath sounds normal  No respiratory distress  Abdominal: Soft  Bowel sounds are normal  There is no tenderness  There is no rebound and no guarding  Musculoskeletal: Normal range of motion  She exhibits no edema  Neurological: She is alert and oriented to person, place, and time  Skin: Skin is warm and dry  No rash noted  Psychiatric: She has a normal mood and affect  Nursing note and vitals reviewed  Vital Signs  ED Triage Vitals   Temp Pulse Resp BP SpO2   -- -- -- -- --      Temp src Heart Rate Source Patient Position - Orthostatic VS BP Location FiO2 (%)   -- -- -- -- --      Pain Score       --           There were no vitals filed for this visit        Visual Acuity      ED Medications  Medications - No data to display    Diagnostic Studies  Results Reviewed     None                 No orders to display              Procedures  Procedures         ED Course  ED Course as of  175   Sun 2020   1728 110[address] [facility],[address] tech notified of [address] order                                  MDM  Number of Diagnoses or Management Options  Threatened  in early pregnancy:   UTI (urinary tract infection) during pregnancy:   Viral syndrome:   Diagnosis management comments: Plan is to obtain laboratories and ultrasound to out ectopic pregnancy versus threatened miscarriage       Amount and/or Complexity of Data Reviewed  Clinical lab tests: ordered and reviewed  Tests in the radiology section of CPT®: ordered  Review and summarize past medical records: yes          Disposition  Final diagnoses:   None     ED Disposition     None      Follow-up Information    None         Patient's Medications   Discharge Prescriptions    No medications on file     No discharge procedures on file      ED Provider  Electronically Signed by           Gisela Cole DO  20 2155       Gisela Cole DO  20 2154

## 2020-01-06 NOTE — ED NOTES
Juice and crackers provided to patient, will reassess blood sugar in 15 minutes        Мария Lora RN  01/05/20 2040

## 2020-01-06 NOTE — ED NOTES
Pt signed consent for Rhogam, just waiting for the lab to send it up        Kandis Acevedo, PAULA  01/05/20 1921

## 2020-01-06 NOTE — ED NOTES
Still waiting for Rhogam from the lab, have been in communication and they are in the process of the order        Liliam Barrett, PAULA  01/05/20 8909

## 2020-01-06 NOTE — ED CARE HANDOFF
Emergency Department Sign Out Note        Sign out and transfer of care from Dr Mary Jane Novoa  See Separate Emergency Department note  The patient, Kristie Newton, was evaluated by the previous provider for vaginal bleeding  Workup Completed:  US OB < 14 weeks with transvaginal   Final Result       Single live intrauterine gestation measuring 8 weeks 3 days by crown-rump length  Follow-up as clinically warranted  Workstation performed: [id number]            Labs Reviewed   HCG, QUANTITATIVE - Abnormal       Result Value Ref Range Status    HCG, Quant 91,586 (*) <=6 mIU/mL Final    Narrative:      Expected Ranges:     Approximate               Approximate HCG  Gestation age          Concentration ( mIU/mL)  _____________          ______________________   [place]                      HCG values  0 2-1                       5-50  1-2                           2-3                         100-5000  3-4                         500-21969  4-5                         1000-27488  5-6                         [id number]  6-8                         [id number]  8-12                        [id number]     BASIC METABOLIC PANEL - Abnormal    Sodium 134 (*) 136 - 145 mmol/L Final    Potassium 3 9  3 5 - 5 3 mmol/L Final    Chloride 101  100 - 108 mmol/L Final    CO2 28  21 - 32 mmol/L Final    ANION GAP 5  4 - 13 mmol/L Final    BUN 9  5 - 25 mg/dL Final    Creatinine 0 45 (*) 0 60 - 1 30 mg/dL Final    Comment: Standardized to IDMS reference method    Glucose 85  65 - 140 mg/dL Final    Comment:   If the patient is fasting, the ADA then defines impaired fasting glucose as > 100 mg/dL and diabetes as > or equal to 123 mg/dL  Specimen collection should occur prior to Sulfasalazine administration due to the potential for falsely depressed results  Specimen collection should occur prior to Sulfapyridine administration due to the potential for falsely elevated results      Calcium 9 5  8 3 - 10 1 mg/dL Final    eGFR 131  ml/min/1 73sq m Final    Narrative:     National Kidney Disease Foundation guidelines for Chronic Kidney Disease (CKD):     Stage 1 with normal or high GFR (GFR > 90 mL/min/1 73 square meters)    Stage 2 Mild CKD (GFR = 60-89 mL/min/1 73 square meters)    Stage 3A Moderate CKD (GFR = 45-59 mL/min/1 73 square meters)    Stage 3B Moderate CKD (GFR = 30-44 mL/min/1 73 square meters)    Stage 4 Severe CKD (GFR = 15-29 mL/min/1 73 square meters)    Stage 5 End Stage CKD (GFR <15 mL/min/1 73 square meters)  Note: GFR calculation is accurate only with a steady state creatinine   POCT PREGNANCY, URINE - Normal    EXT PREG TEST UR (Ref: Negative) Positive   Final    Control Valid   Final   POCT URINALYSIS DIPSTICK - Normal    Color, UA Linda   Final    Clarity, UA Clear   Final    Glucose, UA (Ref: Negative) Negative   Final    Bilirubin, UA (Ref: Negative) Negative   Final    Ketones, UA (Ref: Negative) Negative   Final    Spec Grav, UA (Ref:1 003-1 030) 1 010   Final    Blood, UA (Ref: Negative) Hemolyzed Trace   Final    pH, UA (Ref: 4 5-8 0) 7 0   Final    Protein, UA (Ref: Negative) Negative   Final    Urobilinogen, UA (Ref: 0 2- 1 0) 0 2   Final     Leukocytes, UA (Ref: Negative) Negative   Final    Nitrite, UA (Ref: Negative) POSITIVE   Final   CBC AND DIFFERENTIAL    WBC 7 01  4 31 - 10 16 Thousand/uL Final    RBC 4 68  3 81 - 5 12 Million/uL Final    Hemoglobin 13 0  11 5 - 15 4 g/dL Final    Hematocrit 38 7  34 8 - 46 1 % Final    MCV 83  82 - 98 fL Final    MCH 27 8  26 8 - 34 3 pg Final    MCHC 33 6  31 4 - 37 4 g/dL Final    RDW 11 9  11 6 - 15 1 % Final    MPV 11 3  8 9 - 12 7 fL Final    Platelets 165  278 - 390 Thousands/uL Final    nRBC 0  /100 WBCs Final    Neutrophils Relative 67  43 - 75 % Final    Immat GRANS % 0  0 - 2 % Final    Lymphocytes Relative 25  14 - 44 % Final    Monocytes Relative 7  4 - 12 % Final    Eosinophils Relative 1  0 - 6 % Final    Basophils Relative 0  0 - 1 % Final    Neutrophils Absolute 4 68  1 85 - 7 62 Thousands/µL Final    Immature Grans Absolute 0 01  0 00 - 0 20 Thousand/uL Final    Lymphocytes Absolute 1 72  0 60 - 4 47 Thousands/µL Final    Monocytes Absolute 0 50  0 17 - 1 22 Thousand/µL Final    Eosinophils Absolute 0 07  0 00 - 0 61 Thousand/µL Final    Basophils Absolute 0 03  0 00 - 0 10 Thousands/µL Final   TYPE AND SCREEN    ABO Grouping O   Final    Rh Factor Negative   Final    Antibody Screen Positive   Final    Specimen Expiration Date [id number]   Final   ANTIBODY IDENTIFICATION    ANTIBODY ID  #1 Passive D Antibody, Patient Received RHIG   Final        ED Course / Workup Pending (followup):                            ED Course as of 2110   Maliha Felicitas  Pt signed out from Dr Sahyy Bello  U/S pend, if unremarkable, can be d/c home after rhogam and rx for abx for uti   U/S noted  Pt was feeling slightly shaky, glucose 66  Will let her eat and recheck  After that, and rhogam, she can be d/c home to f/u with OB  Discussed supportive care for likely viral syndrome, and close f/u  Has had N/V, recommend vit B6  Pt also UTI, keflex prescribed   Issues with rhogam are holding up discharge  Nursing staff has contacted pharmacy multiple times          Procedures  MDM  Number of Diagnoses or Management Options  Threatened  in early pregnancy: new and requires workup  UTI (urinary tract infection) during pregnancy: new and requires workup  Viral syndrome: new and requires workup     Amount and/or Complexity of Data Reviewed  Clinical lab tests: reviewed  Tests in the radiology section of CPT®: reviewed  Tests in the medicine section of CPT®: reviewed  Discuss the patient with other providers: yes  Independent visualization of images, tracings, or specimens: yes    Risk of Complications, Morbidity, and/or Mortality  Presenting problems: moderate  Diagnostic procedures: low  Management options: low    Patient Progress  Patient progress: stable      Disposition  Final diagnoses:   Threatened  in early pregnancy   UTI (urinary tract infection) during pregnancy   Viral syndrome     Time reflects when diagnosis was documented in both MDM as applicable and the Disposition within this note     Time User Action Codes Description Comment    2020  6:49 PM Chau Aragon Add [O20 0] Threatened  in early pregnancy     2020  6:55 PM Chau Aragon Add [O23 40] UTI (urinary tract infection) during pregnancy     2020  8:30 PM Jeanna Rosen Add [B34 9] Viral syndrome       ED Disposition     ED Disposition Condition Date/Time Comment    Discharge Stable Sun 2020  8:33 PM Laura James discharge to home/self care              Follow-up Information     Follow up With Specialties Details Why Contact Info Additional [address] [facility] Obstetrics and Gynecology Schedule an appointment as soon as possible for a visit  For further evaluation [address] [facility] [address] [phone] BYGHZP ODEFE YHQIFILLQT RPHBLXMGPM, [address] [place], [place], [address] [address] Emergency Department Emergency Medicine  If symptoms worsen [address] [address] [phone]  ED, [address] [place] [address] [address]        Patient's Medications   Discharge Prescriptions    CEPHALEXIN (KEFLEX) 250 MG CAPSULE    Take 2 capsules (500 mg total) by mouth every 12 (twelve) hours for 5 days       Start Date: 2020  End Date: 1/10/2020       Order Dose: 500 mg       Quantity: 20 capsule    Refills: 0    PYRIDOXINE HCL (VITAMIN B-6) 25 MG TABLET    Take 1 tablet (25 mg total) by mouth 3 (three) times a day as needed (nausea and vomiting during pregnancy)       Start Date: 2020  End Date: --       Order Dose: 25 mg       Quantity: 30 tablet    Refills: 0 No discharge procedures on file         ED Provider  Electronically Signed by     Clemencia Cespedes MD  01/05/20 5191

## 2020-01-07 ENCOUNTER — TELEPHONE (OUTPATIENT)
Dept: ENDOCRINOLOGY | Facility: HOSPITAL | Age: 35
End: 2020-01-07

## 2020-01-17 ENCOUNTER — TELEPHONE (OUTPATIENT)
Dept: EMERGENCY DEPT | Facility: HOSPITAL | Age: 35
End: 2020-01-17

## 2020-01-17 LAB — BLOOD GROUP ANTIBODIES SERPL: NORMAL

## 2020-01-20 ENCOUNTER — TELEPHONE (OUTPATIENT)
Dept: EMERGENCY DEPT | Facility: HOSPITAL | Age: 35
End: 2020-01-20

## 2020-01-23 ENCOUNTER — TELEPHONE (OUTPATIENT)
Dept: EMERGENCY DEPT | Facility: HOSPITAL | Age: 35
End: 2020-01-23

## 2020-01-23 NOTE — TELEPHONE ENCOUNTER
Discussed the true D antibody with the patient and the associated increase risk of miscarriage  Patient had already electively terminated the pregnancy  Patient aware the risk for future pregnancies  Patient aware that she should make her OB/GYN aware  All questions answered prior to ending call

## 2020-02-27 LAB
T3FREE SERPL-MCNC: 4.5 PG/ML (ref 2–4.4)
T4 FREE SERPL-MCNC: 1.73 NG/DL (ref 0.82–1.77)
TSH SERPL DL<=0.005 MIU/L-ACNC: <0.005 UIU/ML (ref 0.45–4.5)

## 2020-02-28 ENCOUNTER — OFFICE VISIT (OUTPATIENT)
Dept: ENDOCRINOLOGY | Facility: HOSPITAL | Age: 35
End: 2020-02-28
Payer: COMMERCIAL

## 2020-02-28 VITALS
WEIGHT: 163 LBS | HEART RATE: 70 BPM | DIASTOLIC BLOOD PRESSURE: 80 MMHG | SYSTOLIC BLOOD PRESSURE: 122 MMHG | BODY MASS INDEX: 28.88 KG/M2 | HEIGHT: 63 IN

## 2020-02-28 DIAGNOSIS — E05.00 GRAVES DISEASE: ICD-10-CM

## 2020-02-28 DIAGNOSIS — E05.90 HYPERTHYROIDISM: Primary | ICD-10-CM

## 2020-02-28 PROCEDURE — 99214 OFFICE O/P EST MOD 30 MIN: CPT | Performed by: INTERNAL MEDICINE

## 2020-02-28 RX ORDER — NORGESTIMATE AND ETHINYL ESTRADIOL 7DAYSX3 LO
1 KIT ORAL DAILY
COMMUNITY

## 2020-02-28 NOTE — PROGRESS NOTES
2/28/2020    Assessment/Plan      Diagnoses and all orders for this visit:    Hyperthyroidism  -     Comprehensive metabolic panel Lab Collect; Future  -     CBC and differential Lab Collect; Future  -     TSH, 3rd generation Lab Collect; Future  -     T4, free Lab Collect; Future  -     T3, free; Future  -     Comprehensive metabolic panel Lab Collect  -     CBC and differential Lab Collect  -     TSH, 3rd generation Lab Collect  -     T4, free Lab Collect  -     T3, free    Graves disease  -     Comprehensive metabolic panel Lab Collect; Future  -     CBC and differential Lab Collect; Future  -     TSH, 3rd generation Lab Collect; Future  -     T4, free Lab Collect; Future  -     T3, free; Future  -     Comprehensive metabolic panel Lab Collect  -     CBC and differential Lab Collect  -     TSH, 3rd generation Lab Collect  -     T4, free Lab Collect  -     T3, free    Other orders  -     norgestimate-ethinyl estradiol (Tri-Lo-Cheryl) 0 18/0 215/0 25 MG-25 MCG per tablet; Take 1 tablet by mouth daily        Assessment/Plan:  1  Hyperthyroidism  Most recent thyroid function tests do show that her TSH is still fully suppressed and her T3 is elevated but T4 is normal   She has not been taking her methimazole appropriately  I have asked her to take her methimazole 10 mg 2 tablets twice a day consistently so that we get this hyperthyroidism under control  I offered her the possibility of radioiodine ablation which she does not want to do at this point  2  Graves disease  She has no evidence of Graves ophthalmopathy  I have asked her to follow up in 3 months with preceding TSH, free T4, free T3, CMP, and CBC  CC:  Hyperthyroid follow-up    History of Present Illness     HPI: Kathie Cui is a 29y o  year old female with history of hyperthyroidism due to Graves disease here for follow-up  She was diagnosed with Graves disease in the spring 2019   She had mild itching and rash when she was on higher doses of methimazole which resolved on its own  She has been able tolerate lid methimazole at this point  She is currently taking methimazole 10 mg 1 tablets twice a day and propranolol 10 mg daily  She forgets to take the other doses later in the day instead of 4 tablets a day  Admits she had not been taking it when caring with her father  Weight is 17 lb more than last visit  She has been having some problems with waking up throughout the night as she had been under lot more stress  She has dry skin but no itching or rash  She denies brittle nails or hair loss  She denies heat or cold intolerance, diarrhea or constipation, anxiety or depression, palpitation, tremors, or fatigue  Menstrual cycles are occurring on a regular monthly basis  She did just start oral contraceptives and has not yet gotten a menses since starting them  She denies diplopia  She was pregnant in 2020 and had   Under a lot of stress with helping care for dad with recent illnesses  Review of Systems   Constitutional: Positive for unexpected weight change  Negative for fatigue  Weight 17 lb more than last visit  HENT: Negative for trouble swallowing  Eyes: Negative for visual disturbance  No diplopia  Respiratory: Negative for chest tightness and shortness of breath  Cardiovascular: Negative for chest pain and palpitations  Gastrointestinal: Negative for abdominal pain, constipation, diarrhea and nausea  Endocrine: Negative for cold intolerance and heat intolerance  Genitourinary:        Menses were regular on a monthly basis  Has not yet gotten menses since   Started OCP about 3 weeks ago  Skin: Negative for rash  No rash or itching skin  Has dry skin  No brittle nails  No hair loss  Neurological: Negative for dizziness, tremors, light-headedness and headaches  Psychiatric/Behavioral: Negative for decreased concentration and sleep disturbance   The patient is not nervous/anxious  Under a lot of stress  Does wake some throughout the night  Not working at present and job closed in 2019  Historical Information   No past medical history on file    Past Surgical History:   Procedure Laterality Date    APPENDECTOMY       SECTION      X 2     Social History   Social History     Substance and Sexual Activity   Alcohol Use Never    Frequency: Never     Social History     Substance and Sexual Activity   Drug Use Not Currently    Types: Heroin    Comment: off herion for 3 years     Social History     Tobacco Use   Smoking Status Current Every Day Smoker    Packs/day: 0 50    Types: Cigarettes   Smokeless Tobacco Never Used     Family History:   Family History   Problem Relation Age of Onset    Obesity Mother     Diabetes type II Mother     Psoriasis Mother         psoriatic arthritis    Diabetes type I Father         bilateral amputations    Obesity Father     Hyperlipidemia Father     Kidney failure Father     Heart attack Father         post CABG    Hyperthyroidism Paternal Aunt     Thyroid disease unspecified Paternal Aunt         3 aunts with thyroid disease    Thyroid cancer Paternal Aunt     Cancer Paternal Grandmother     Cancer Paternal Grandfather     No Known Problems Sister     No Known Problems Brother     No Known Problems Son     No Known Problems Son     Hyperthyroidism Maternal Aunt     Thyroid disease unspecified Maternal Aunt        Meds/Allergies   Current Outpatient Medications   Medication Sig Dispense Refill    methadone (DOLOPHINE) 10 MG/5ML solution Take 65 mg by mouth daily      methimazole (TAPAZOLE) 10 mg tablet Take 3 tablets (30 mg total) by mouth 3 (three) times a day (Patient taking differently: Take 20 mg by mouth 2 (two) times a day ) 810 tablet 2    norgestimate-ethinyl estradiol (Tri-Lo-Cheryl) 0 18/0 215/0 25 MG-25 MCG per tablet Take 1 tablet by mouth daily      propranolol (INDERAL) 10 mg tablet Take 10 mg by mouth daily   6     No current facility-administered medications for this visit  Allergies   Allergen Reactions    Lasix [Furosemide] Hives       Objective   Vitals: Blood pressure 122/80, pulse 70, height 5' 3" (1 6 m), weight 73 9 kg (163 lb), not currently breastfeeding  Invasive Devices     None                 Physical Exam   Constitutional: She is oriented to person, place, and time  She appears well-developed and well-nourished  HENT:   Head: Normocephalic and atraumatic  Eyes: Pupils are equal, round, and reactive to light  Conjunctivae and EOM are normal    No lid lag, stare, proptosis, or periorbital edema  Neck: Normal range of motion  Neck supple  Thyromegaly present  Thyroid enlarged  No bruits over the thyroid gland or carotids  No palpable thyroid nodules  Cardiovascular: Normal rate, regular rhythm and normal heart sounds  No murmur heard  Pulmonary/Chest: Effort normal  She has no wheezes  Some expiratory wheezes throughout the lung fields  Musculoskeletal: Normal range of motion  She exhibits no edema or deformity  No tremor of the outstretched hands  Lymphadenopathy:     She has no cervical adenopathy  Neurological: She is alert and oriented to person, place, and time  She has normal reflexes  Deep tendon reflexes normal    Skin: Skin is warm and dry  No rash noted  Vitals reviewed  The history was obtained from the review of the chart and from the patient  Lab Results:     Blood work done on 02/26/2020 showed a TSH of less than 0 005, free T4 is 1 73, free T3 is 4 5      Lab Results   Component Value Date    CREATININE 0 45 (L) 01/05/2020    CREATININE 0 49 (L) 08/27/2019    BUN 9 01/05/2020    K 3 9 01/05/2020     01/05/2020    CO2 28 01/05/2020     eGFR   Date Value Ref Range Status   01/05/2020 131 ml/min/1 73sq m Final       Lab Results   Component Value Date    ALT 31 08/27/2019    AST 29 08/27/2019       Future Appointments Date Time Provider Blue Alysia   6/3/2020 10:00 AM Alondra Cruz MD [facility]

## 2020-02-28 NOTE — PATIENT INSTRUCTIONS
The thyroid is still a bit overactive but improved  You need to take the methimazole 10 mg 2 tablets twice a day consistently  No stopping of the medicine  You have the option of deciding to do I131 treatment if you choose  Follow up in 3 months with blood work

## 2023-03-30 ENCOUNTER — OFFICE VISIT (OUTPATIENT)
Dept: ENDOCRINOLOGY | Facility: HOSPITAL | Age: 38
End: 2023-03-30

## 2023-03-30 VITALS
DIASTOLIC BLOOD PRESSURE: 78 MMHG | HEIGHT: 62 IN | WEIGHT: 153 LBS | HEART RATE: 81 BPM | SYSTOLIC BLOOD PRESSURE: 120 MMHG | BODY MASS INDEX: 28.16 KG/M2

## 2023-03-30 DIAGNOSIS — E05.00 GRAVES DISEASE: ICD-10-CM

## 2023-03-30 DIAGNOSIS — E05.90 HYPERTHYROIDISM: Primary | ICD-10-CM

## 2023-03-30 RX ORDER — METHIMAZOLE 10 MG/1
20 TABLET ORAL DAILY
Qty: 60 TABLET | Refills: 6 | Status: SHIPPED | OUTPATIENT
Start: 2023-03-30

## 2023-03-30 RX ORDER — METOPROLOL SUCCINATE 50 MG/1
50 TABLET, EXTENDED RELEASE ORAL DAILY
Qty: 30 TABLET | Refills: 3 | Status: SHIPPED | OUTPATIENT
Start: 2023-03-30

## 2023-03-30 RX ORDER — METOPROLOL SUCCINATE 50 MG/1
50 TABLET, EXTENDED RELEASE ORAL DAILY
COMMUNITY
End: 2023-03-30 | Stop reason: SDUPTHER

## 2023-03-30 NOTE — PROGRESS NOTES
3/30/2023    Assessment/Plan      Diagnoses and all orders for this visit:    Hyperthyroidism  -     T4, free Lab Collect  -     T3, free  -     TSH, 3rd generation Lab Collect; Future  -     T4, free Lab Collect; Future  -     Comprehensive metabolic panel Lab Collect; Future  -     T3, free; Future  -     TSH, 3rd generation Lab Collect; Future  -     T4, free Lab Collect; Future  -     Comprehensive metabolic panel Lab Collect; Future  -     CBC and differential Lab Collect; Future  -     T3, free; Future  -     methimazole (TAPAZOLE) 10 mg tablet; Take 2 tablets (20 mg total) by mouth in the morning  -     metoprolol succinate (TOPROL-XL) 50 mg 24 hr tablet; Take 1 tablet (50 mg total) by mouth daily    Graves disease  -     T4, free Lab Collect  -     T3, free  -     TSH, 3rd generation Lab Collect; Future  -     T4, free Lab Collect; Future  -     Comprehensive metabolic panel Lab Collect; Future  -     T3, free; Future  -     TSH, 3rd generation Lab Collect; Future  -     T4, free Lab Collect; Future  -     Comprehensive metabolic panel Lab Collect; Future  -     CBC and differential Lab Collect; Future  -     T3, free; Future    Other orders  -     Discontinue: metoprolol succinate (TOPROL-XL) 50 mg 24 hr tablet; Take 50 mg by mouth daily        Assessment/Plan:  1  Hyperthyroidism  She does have hyperthyroid symptoms and TSH is fully suppressed although T4 level is normal   I will check a free T3 and free T4 level now to see if the free T3 is elevated  We did discuss the 3 options of treatment including antithyroid medication, radioactive iodine, and thyroid surgery  She is elected to restart antithyroid medication  I will then start methimazole 20 mg daily  I will repeat a TSH with free T4 and free T3 along with CMP in 6 weeks and adjust accordingly  2   Graves' disease  She does have a history of hyperthyroidism due to Graves' disease    She has no evidence of Graves' ophthalmopathy but her TSH is now fully suppressed  She will get blood work done now consisting of a free T4 and free T3  I have asked her to repeat blood work in 6 weeks consisting of a TSH, free T4, free T3, and CMP  I have asked her to follow-up in 3 months with preceding TSH, free T4, free T3, CMP, and CBC  CC: Thyroid consult    History of Present Illness     HPI: Andre Floyd is a 40y o  year old female with history of hyperthyroidism for evaluation/consult  She was diagnosed with hyperthyroidism in June 2019 after a several month episode of leg edema  She was started on methimazole treatment  Methimazole was tapered as blood work improved and she was last seen by endocrinology in February 2020  Sometime after that, she stopped her methimazole treatment on her own but is unclear when  She was feeling well up until January 2023 when she noticed she had constant diarrhea  She also was under a lot of stress but blood work was done by her primary care physician showing abnormality and thyroid function studies and she was asked to follow-up with endocrinology  She did not yet get an appointment and started to have worsening symptoms  She was getting heart racing and pounding feeling shaky with mind racing and insomnia  She went to see urgent care last week and was started on metoprolol  Starting metoprolol, her heart racing and palpitations along with tremors and shakiness have improved  She is fatigued  She is hot and sweaty  She denies cold intolerance  She has lost weight in the last 2 months, about 15 pounds  She continues to have anxiety and insomnia and diarrhea  She denies depression or constipation  She denies dry skin, brittle nails, or hair loss  She denies diplopia  She denies compressive thyroid symptoms or difficulties with swallowing  Menstrual cycles have been absent as she has been on Depo-Provera for the last 2 years  Review of Systems   Constitutional: Positive for fatigue  Negative for unexpected weight change  Weight loss 168 lbs to 153 lbs in about 2 months  HENT: Negative for hearing loss, tinnitus and trouble swallowing  Feels something in your throat with swallowing  Mostly on the left  Eyes: Negative for visual disturbance  No diplopia  Respiratory: Negative for chest tightness and shortness of breath  Cardiovascular: Positive for palpitations and leg swelling  Negative for chest pain  Palpitations improving  Occasional edema  Gastrointestinal: Positive for diarrhea  Negative for abdominal pain, constipation and nausea  Endocrine: Positive for heat intolerance  Negative for cold intolerance  Genitourinary:        No menses due to being on Depo-Provera for the last 2 years  Musculoskeletal: Negative for arthralgias and back pain  Skin: Negative for rash  No dry skin  No brittle nails  No hair loss  Neurological: Positive for dizziness, tremors and light-headedness  Negative for numbness and headaches  Was dizzy and lightheaded before the metoprolol and was shaky  Psychiatric/Behavioral: Positive for sleep disturbance  Negative for dysphoric mood  The patient is nervous/anxious  Sleeping varies  Had insomnia, will have difficulty falling asleep , mind racing  Metoprolol helping  Has anxiety  Historical Information   History reviewed  No pertinent past medical history    Past Surgical History:   Procedure Laterality Date   • APPENDECTOMY     •  SECTION      X 2     Social History   Social History     Substance and Sexual Activity   Alcohol Use Yes    Comment: occasional     Social History     Substance and Sexual Activity   Drug Use Not Currently   • Types: Heroin    Comment: off heroin for 8yearsas of      Social History     Tobacco Use   Smoking Status Every Day   • Packs/day: 0 50   • Types: Cigarettes   Smokeless Tobacco Never     Family History:   Family History   Problem Relation "Age of Onset   • Obesity Mother    • Diabetes type II Mother    • Psoriasis Mother         psoriatic arthritis   • Diabetes type I Father         bilateral amputations   • Obesity Father    • Hyperlipidemia Father    • Kidney failure Father    • Heart attack Father         post CABG   • Obesity Sister    • No Known Problems Brother    • Hyperthyroidism Maternal Aunt    • Thyroid disease unspecified Maternal Aunt    • Hyperthyroidism Paternal Aunt    • Thyroid disease unspecified Paternal Aunt         3 aunts with thyroid disease   • Thyroid cancer Paternal Aunt    • Cancer Paternal Grandmother    • Cancer Paternal Grandfather    • No Known Problems Son    • No Known Problems Son        Meds/Allergies   Current Outpatient Medications   Medication Sig Dispense Refill   • methimazole (TAPAZOLE) 10 mg tablet Take 2 tablets (20 mg total) by mouth in the morning 60 tablet 6   • metoprolol succinate (TOPROL-XL) 50 mg 24 hr tablet Take 1 tablet (50 mg total) by mouth daily 30 tablet 3   • norgestimate-ethinyl estradiol (ORTHO TRI-CYCLEN LO) 0 18/0 215/0 25 MG-25 MCG per tablet Take 1 tablet by mouth daily (Patient not taking: Reported on 3/30/2023)       No current facility-administered medications for this visit  Allergies   Allergen Reactions   • Lasix [Furosemide] Hives       Objective   Vitals: Blood pressure 120/78, pulse 81, height 5' 2\" (1 575 m), weight 69 4 kg (153 lb), not currently breastfeeding  Invasive Devices     None                 Physical Exam  Vitals reviewed  Constitutional:       Appearance: Normal appearance  She is well-developed  HENT:      Head: Normocephalic and atraumatic  Eyes:      Extraocular Movements: Extraocular movements intact  Conjunctiva/sclera: Conjunctivae normal       Comments: No lid lag, stare, proptosis, or periorbital edema  Neck:      Thyroid: No thyromegaly  Vascular: No carotid bruit  Comments: Thyroid enlarged, right greater then left   Right about " 6 cm   No bruits over the thyroid gland  Cardiovascular:      Rate and Rhythm: Normal rate and regular rhythm  Heart sounds: Normal heart sounds  No murmur heard  Pulmonary:      Effort: Pulmonary effort is normal       Breath sounds: Normal breath sounds  No wheezing  Abdominal:      General: Bowel sounds are normal       Palpations: Abdomen is soft  Tenderness: There is no abdominal tenderness  Musculoskeletal:         General: No deformity  Normal range of motion  Cervical back: Normal range of motion and neck supple  Right lower leg: No edema  Left lower leg: No edema  Comments: Slight tremor of the outstretched hands  No spinous process tenderness  No CVA tenderness  Lymphadenopathy:      Cervical: No cervical adenopathy  Skin:     General: Skin is warm and dry  Findings: No rash  Neurological:      Mental Status: She is alert and oriented to person, place, and time  Deep Tendon Reflexes: Reflexes are normal and symmetric  Comments: Patellar deep tendon reflexes normal          The history was obtained from the review of the chart and from the patient  Lab Results:      Blood work performed on 1/28/2023 showed a TSH of 0 177 with a total T4 of 11 9 and a T3 uptake of 34% with a free thyroxine index of 4  Blood work done on 3/27/2023 showed a TSH of less than 0 005 with a total T4 of 10 1, T3 uptake 34%, free thyroxine index 3 4      Lab Results   Component Value Date    CREATININE 0 45 (L) 01/05/2020    CREATININE 0 49 (L) 08/27/2019    BUN 9 01/05/2020    K 3 9 01/05/2020     01/05/2020    CO2 28 01/05/2020     eGFR   Date Value Ref Range Status   01/05/2020 131 ml/min/1 73sq m Final         Lab Results   Component Value Date    ALT 31 08/27/2019    AST 29 08/27/2019       Lab Results   Component Value Date    TSH <0 005 (L) 02/26/2020    FREET4 1 73 02/26/2020             Future Appointments   Date Time Provider [place] 8/15/2023  9:40 AM Uriel Avilez MD [facility]

## 2023-03-30 NOTE — PATIENT INSTRUCTIONS
Let's get free T4 and free T3 levels now  Let's start methimazole 10 mg 2 tablets daily  We will recheck blood work in 6 weeks  Follow up in 3 months with blood work

## 2023-03-31 ENCOUNTER — TELEPHONE (OUTPATIENT)
Dept: ENDOCRINOLOGY | Facility: HOSPITAL | Age: 38
End: 2023-03-31

## 2023-03-31 NOTE — TELEPHONE ENCOUNTER
Dr Pastor Callejas completed patients FMLA paperwork  I spoke to patient and she states she will pick it up on Monday  I copied the paperwork and sent a copy to [facility] to be scanned

## 2023-05-13 DIAGNOSIS — E05.90 HYPERTHYROIDISM: ICD-10-CM

## 2023-05-15 RX ORDER — METOPROLOL SUCCINATE 50 MG/1
TABLET, EXTENDED RELEASE ORAL
Qty: 90 TABLET | Refills: 2 | Status: SHIPPED | OUTPATIENT
Start: 2023-05-15

## 2023-08-15 ENCOUNTER — OFFICE VISIT (OUTPATIENT)
Dept: ENDOCRINOLOGY | Facility: HOSPITAL | Age: 38
End: 2023-08-15
Payer: COMMERCIAL

## 2023-08-15 VITALS
DIASTOLIC BLOOD PRESSURE: 78 MMHG | BODY MASS INDEX: 28.16 KG/M2 | HEART RATE: 58 BPM | WEIGHT: 153 LBS | SYSTOLIC BLOOD PRESSURE: 120 MMHG | HEIGHT: 62 IN

## 2023-08-15 DIAGNOSIS — E05.00 GRAVES DISEASE: ICD-10-CM

## 2023-08-15 DIAGNOSIS — E05.90 HYPERTHYROIDISM: Primary | ICD-10-CM

## 2023-08-15 PROCEDURE — 99213 OFFICE O/P EST LOW 20 MIN: CPT | Performed by: INTERNAL MEDICINE

## 2023-08-15 NOTE — PROGRESS NOTES
8/15/2023    Assessment/Plan      Diagnoses and all orders for this visit:    Hyperthyroidism  -     TSH, 3rd generation Lab Collect  -     T4, free Lab Collect  -     Comprehensive metabolic panel Lab Collect  -     CBC and differential Lab Collect  -     T3, free  -     TSH, 3rd generation Lab Collect; Future  -     T4, free Lab Collect; Future  -     T3, free; Future  -     Comprehensive metabolic panel Lab Collect; Future    Graves disease  -     TSH, 3rd generation Lab Collect  -     T4, free Lab Collect  -     Comprehensive metabolic panel Lab Collect  -     CBC and differential Lab Collect  -     T3, free  -     TSH, 3rd generation Lab Collect; Future  -     T4, free Lab Collect; Future  -     T3, free; Future  -     Comprehensive metabolic panel Lab Collect; Future        Assessment/Plan:  1. Hyperthyroidism. She did not yet get her blood work done for this visit and did not get any of her blood work done between visits. For now, she will continue the same methimazole 20 mg daily yes I have no idea what her thyroid function is. She will get blood work done now. 2.  Graves' disease. She has no evidence of Graves' ophthalmopathy. I have asked her to get blood work done now consisting of a TSH, free T4, free T3, CMP, and CBC. I have asked her to follow-up in 3 months with preceding TSH, free T4, free T3, and CMP. CC: Hyperthyroid, Graves' follow-up    History of Present Illness     HPI: Yudy Lim is a 40y.o. year old female with history of hyperthyroidism for follow-up visit. She was diagnosed with hyperthyroidism in June 2019 after a several month episode of leg edema. She was started on methimazole treatment. Methimazole was tapered as blood work improved and she was last seen by endocrinology in February 2020. Sometime after that, she stopped her methimazole treatment on her own but is unclear when.   She was feeling well up until January 2023 when she noticed she had constant diarrhea. She also was under a lot of stress but blood work was done by her primary care physician showing abnormality and thyroid function studies and she was asked to follow-up with endocrinology. She did not yet get an appointment and started to have worsening symptoms. She was getting heart racing and pounding feeling shaky with mind racing and insomnia. She went to see urgent care in March 2023 and was started on metoprolol. She was seen by endocrine in March 2023 and started on methimazole. She is taking methimazole 20 mg daily and metoprolol XL 50 mg daily. She reports that she still can be always hot and sweaty but then will get cold. She still has chronic diarrhea that can be urgent. She has some heart racing at times but it is improved some. She still has tremors. She is fatigued all the time. Weight is stable. She has issues with sleeping and is waking every hour and is very anxious but has a lot of life stressors. She has no depression or constipation. She denies dry skin, or hair loss but has brittle nails. She has no menses as she has been on Depo-Provera and her last injection was 3 weeks ago. She denies diplopia. She denies compressive thyroid symptoms or difficulties with swallowing but has occasional episodes of neck pain. Review of Systems   Constitutional: Positive for fatigue. Negative for unexpected weight change. Feels fatigued all the time. HENT: Negative for trouble swallowing. Occasional episodes of neck pain. No longer sensation of swallowing issues  On the left. Eyes: Negative for visual disturbance. No diplopia. Respiratory: Negative for chest tightness and shortness of breath. Cardiovascular: Positive for palpitations. Negative for chest pain. Still some heart racing at times, it is improved than int he past.    Gastrointestinal: Positive for diarrhea. Negative for abdominal pain, constipation and nausea.         Still chronic diarrhea. Also urgent. Endocrine: Positive for cold intolerance and heat intolerance. Always hot and sweaty and then gets cold. Genitourinary:        Just took last depo injection 3 weks ago. No menses with the depo injections. Musculoskeletal: Positive for arthralgias. Joints and body hurts all over. Skin: Negative for rash. No dry skin except on the scalp. No hair loss. Has brittle nails. Neurological: Positive for tremors and weakness. Negative for dizziness, light-headedness and headaches. Feels generally weak in the muscles. Rare tremors. Psychiatric/Behavioral: Positive for sleep disturbance. Negative for dysphoric mood. The patient is nervous/anxious. Still wakes every hour. Some anxiety and a lot of life stressors. Historical Information   History reviewed. No pertinent past medical history.   Past Surgical History:   Procedure Laterality Date   • APPENDECTOMY     •  SECTION      X 2     Social History   Social History     Substance and Sexual Activity   Alcohol Use Yes    Comment: occasional     Social History     Substance and Sexual Activity   Drug Use Not Currently   • Types: Heroin    Comment: off heroin for 8yearsas of      Social History     Tobacco Use   Smoking Status Every Day   • Packs/day: 0.50   • Types: Cigarettes   Smokeless Tobacco Never     Family History:   Family History   Problem Relation Age of Onset   • Obesity Mother    • Diabetes type II Mother    • Psoriasis Mother         psoriatic arthritis   • Diabetes type I Father         bilateral amputations   • Obesity Father    • Hyperlipidemia Father    • Kidney failure Father    • Heart attack Father         post CABG   • Obesity Sister    • No Known Problems Brother    • Hyperthyroidism Maternal Aunt    • Thyroid disease unspecified Maternal Aunt    • Hyperthyroidism Paternal Aunt    • Thyroid disease unspecified Paternal Aunt         3 aunts with thyroid disease   • Thyroid cancer Paternal Aunt    • Cancer Paternal Grandmother    • Cancer Paternal Grandfather    • No Known Problems Son    • No Known Problems Son        Meds/Allergies   Current Outpatient Medications   Medication Sig Dispense Refill   • methimazole (TAPAZOLE) 10 mg tablet TAKE 2 TABLETS (20 MG) BY MOUTH IN THE MORNING 180 tablet 0   • metoprolol succinate (TOPROL-XL) 50 mg 24 hr tablet TAKE 1 TABLET BY MOUTH EVERY DAY 90 tablet 2   • norgestimate-ethinyl estradiol (ORTHO TRI-CYCLEN LO) 0.18/0.215/0.25 MG-25 MCG per tablet Take 1 tablet by mouth daily (Patient not taking: Reported on 3/30/2023)       No current facility-administered medications for this visit. Allergies   Allergen Reactions   • Lasix [Furosemide] Hives       Objective   Vitals: Blood pressure 120/78, pulse 58, height 5' 2" (1.575 m), weight 69.4 kg (153 lb), not currently breastfeeding. Invasive Devices     None                 Physical Exam  Vitals reviewed. Constitutional:       Appearance: Normal appearance. She is well-developed. HENT:      Head: Normocephalic and atraumatic. Eyes:      Extraocular Movements: Extraocular movements intact. Conjunctiva/sclera: Conjunctivae normal.      Comments: No lid lag, stare, proptosis, or periorbital edema. Neck:      Thyroid: No thyromegaly. Vascular: No carotid bruit. Comments: Thyroid enlarged with no nodules palpable. No bruits over the thyroid gland. Cardiovascular:      Rate and Rhythm: Normal rate and regular rhythm. Heart sounds: Normal heart sounds. No murmur heard. Pulmonary:      Effort: Pulmonary effort is normal.      Breath sounds: Normal breath sounds. No wheezing. Abdominal:      Palpations: Abdomen is soft. Musculoskeletal:         General: No deformity. Normal range of motion. Cervical back: Normal range of motion and neck supple. Right lower leg: No edema. Left lower leg: No edema.       Comments: Slight tremor of the outstretched hands. Lymphadenopathy:      Cervical: No cervical adenopathy. Skin:     General: Skin is warm and dry. Findings: No rash. Neurological:      Mental Status: She is alert and oriented to person, place, and time. Deep Tendon Reflexes: Reflexes are normal and symmetric. Comments: Patellar deep tendon reflexes normal.         The history was obtained from the review of the chart and from the patient. Lab Results:      I have no recent blood work as she did not get her blood work done for this visit.     Lab Results   Component Value Date    CREATININE 0.45 (L) 01/05/2020    CREATININE 0.49 (L) 08/27/2019    BUN 9 01/05/2020    K 3.9 01/05/2020     01/05/2020    CO2 28 01/05/2020     eGFR   Date Value Ref Range Status   01/05/2020 131 ml/min/1.73sq m Final         Lab Results   Component Value Date    ALT 31 08/27/2019    AST 29 08/27/2019       Lab Results   Component Value Date    TSH <0.005 (L) 02/26/2020    FREET4 1.73 02/26/2020             Future Appointments   Date Time Provider [address]   12/19/2023  3:40 PM Rody Villalpando MD [facility]

## 2023-08-15 NOTE — PATIENT INSTRUCTIONS
We'll get blood work done now. No change in blood work for now. Follow up in 3 months with blood work.

## 2023-09-03 LAB
ALBUMIN SERPL-MCNC: 5.1 G/DL (ref 3.9–4.9)
ALBUMIN/GLOB SERPL: 2.8 {RATIO} (ref 1.2–2.2)
ALP SERPL-CCNC: 78 IU/L (ref 44–121)
ALT SERPL-CCNC: 9 IU/L (ref 0–32)
AST SERPL-CCNC: 10 IU/L (ref 0–40)
BASOPHILS # BLD AUTO: 0 X10E3/UL (ref 0–0.2)
BASOPHILS NFR BLD AUTO: 1 %
BILIRUB SERPL-MCNC: 0.2 MG/DL (ref 0–1.2)
BUN SERPL-MCNC: 12 MG/DL (ref 6–20)
BUN/CREAT SERPL: 14 (ref 9–23)
CALCIUM SERPL-MCNC: 9.7 MG/DL (ref 8.7–10.2)
CHLORIDE SERPL-SCNC: 103 MMOL/L (ref 96–106)
CO2 SERPL-SCNC: 20 MMOL/L (ref 20–29)
CREAT SERPL-MCNC: 0.85 MG/DL (ref 0.57–1)
EGFR: 90 ML/MIN/1.73
EOSINOPHIL # BLD AUTO: 0.2 X10E3/UL (ref 0–0.4)
EOSINOPHIL NFR BLD AUTO: 2 %
ERYTHROCYTE [DISTWIDTH] IN BLOOD BY AUTOMATED COUNT: 13.5 % (ref 11.7–15.4)
GLOBULIN SER-MCNC: 1.8 G/DL (ref 1.5–4.5)
GLUCOSE SERPL-MCNC: 96 MG/DL (ref 70–99)
HCT VFR BLD AUTO: 45 % (ref 34–46.6)
HGB BLD-MCNC: 14.5 G/DL (ref 11.1–15.9)
IMM GRANULOCYTES # BLD: 0 X10E3/UL (ref 0–0.1)
IMM GRANULOCYTES NFR BLD: 0 %
LYMPHOCYTES # BLD AUTO: 1.9 X10E3/UL (ref 0.7–3.1)
LYMPHOCYTES NFR BLD AUTO: 30 %
MCH RBC QN AUTO: 30.7 PG (ref 26.6–33)
MCHC RBC AUTO-ENTMCNC: 32.2 G/DL (ref 31.5–35.7)
MCV RBC AUTO: 95 FL (ref 79–97)
MONOCYTES # BLD AUTO: 0.5 X10E3/UL (ref 0.1–0.9)
MONOCYTES NFR BLD AUTO: 8 %
NEUTROPHILS # BLD AUTO: 3.7 X10E3/UL (ref 1.4–7)
NEUTROPHILS NFR BLD AUTO: 59 %
PLATELET # BLD AUTO: 213 X10E3/UL (ref 150–450)
POTASSIUM SERPL-SCNC: 4.9 MMOL/L (ref 3.5–5.2)
PROT SERPL-MCNC: 6.9 G/DL (ref 6–8.5)
RBC # BLD AUTO: 4.72 X10E6/UL (ref 3.77–5.28)
SODIUM SERPL-SCNC: 139 MMOL/L (ref 134–144)
T3FREE SERPL-MCNC: 2.8 PG/ML (ref 2–4.4)
T4 FREE SERPL-MCNC: 1.44 NG/DL (ref 0.82–1.77)
TSH SERPL DL<=0.005 MIU/L-ACNC: 5.53 UIU/ML (ref 0.45–4.5)
WBC # BLD AUTO: 6.3 X10E3/UL (ref 3.4–10.8)

## 2024-03-07 ENCOUNTER — HOSPITAL ENCOUNTER (EMERGENCY)
Dept: HOSPITAL 99 - EMR | Age: 39
LOS: 1 days | Discharge: HOME | End: 2024-03-08
Payer: COMMERCIAL

## 2024-03-07 VITALS — RESPIRATION RATE: 18 BRPM | DIASTOLIC BLOOD PRESSURE: 60 MMHG | SYSTOLIC BLOOD PRESSURE: 135 MMHG

## 2024-03-07 VITALS — BODY MASS INDEX: 23.8 KG/M2

## 2024-03-07 VITALS — SYSTOLIC BLOOD PRESSURE: 117 MMHG | DIASTOLIC BLOOD PRESSURE: 71 MMHG

## 2024-03-07 VITALS — RESPIRATION RATE: 16 BRPM

## 2024-03-07 DIAGNOSIS — R19.7: ICD-10-CM

## 2024-03-07 DIAGNOSIS — R10.9: Primary | ICD-10-CM

## 2024-03-07 LAB
ALBUMIN SERPL-MCNC: 5.3 G/DL (ref 3.5–5)
ALP SERPL-CCNC: 81 U/L (ref 38–126)
ALT SERPL-CCNC: 14 U/L (ref 0–35)
AST SERPL-CCNC: 20 U/L (ref 14–36)
BUN SERPL-MCNC: 12 MG/DL (ref 7–17)
CALCIUM SERPL-MCNC: 10 MG/DL (ref 8.4–10.2)
CHLORIDE SERPL-SCNC: 106 MMOL/L (ref 98–107)
CO2 SERPL-SCNC: 26 MMOL/L (ref 22–30)
EGFR: > 60
ERYTHROCYTE [DISTWIDTH] IN BLOOD BY AUTOMATED COUNT: 13 % (ref 11.5–14.5)
GLUCOSE SERPL-MCNC: 91 MG/DL (ref 70–99)
HCT VFR BLD AUTO: 40.3 % (ref 37–47)
HGB BLD-MCNC: 14.3 G/DL (ref 12–16)
LIPASE SERPL-CCNC: 51 U/L (ref 23–300)
MCHC RBC AUTO-ENTMCNC: 35.5 G/DL (ref 33–37)
MCV RBC AUTO: 84.8 FL (ref 81–99)
NRBC BLD AUTO-RTO: 0 %
PLATELET # BLD AUTO: 257 10^3/UL (ref 130–400)
POTASSIUM SERPL-SCNC: 4.3 MMOL/L (ref 3.5–5.1)
PROT SERPL-MCNC: 8 G/DL (ref 6.3–8.2)
SODIUM SERPL-SCNC: 138 MMOL/L (ref 135–145)

## 2024-03-07 PROCEDURE — 99284 EMERGENCY DEPT VISIT MOD MDM: CPT

## 2024-03-07 RX ADMIN — IOHEXOL 50 ML: 240 INJECTION, SOLUTION INTRATHECAL; INTRAVASCULAR; INTRAVENOUS; ORAL at 23:18

## 2024-03-08 VITALS — DIASTOLIC BLOOD PRESSURE: 72 MMHG | SYSTOLIC BLOOD PRESSURE: 116 MMHG

## 2025-07-05 ENCOUNTER — HOSPITAL ENCOUNTER (EMERGENCY)
Dept: HOSPITAL 99 - EMR | Age: 40
Discharge: LEFT BEFORE DISCHARGE | End: 2025-07-05
Payer: COMMERCIAL

## 2025-07-05 VITALS — SYSTOLIC BLOOD PRESSURE: 129 MMHG | DIASTOLIC BLOOD PRESSURE: 86 MMHG

## 2025-07-05 DIAGNOSIS — R55: Primary | ICD-10-CM

## 2025-07-05 DIAGNOSIS — R10.9: ICD-10-CM

## 2025-07-05 DIAGNOSIS — Z53.29: ICD-10-CM

## 2025-07-05 DIAGNOSIS — Z90.49: ICD-10-CM

## 2025-07-05 PROCEDURE — 99282 EMERGENCY DEPT VISIT SF MDM: CPT
